# Patient Record
Sex: MALE | Race: WHITE | NOT HISPANIC OR LATINO | Employment: OTHER | URBAN - METROPOLITAN AREA
[De-identification: names, ages, dates, MRNs, and addresses within clinical notes are randomized per-mention and may not be internally consistent; named-entity substitution may affect disease eponyms.]

---

## 2020-06-10 ENCOUNTER — PATIENT OUTREACH (OUTPATIENT)
Dept: ADMINISTRATIVE | Facility: HOSPITAL | Age: 50
End: 2020-06-10

## 2020-06-10 NOTE — LETTER
Delicia 10, 2020    Dev Escalera  [id number]  [place] [id number]             [facility]  [address]  [facility] [address]  Phone: [phone] Dear [facility] is [facility].  To help you get the most out of each of your visits, we will review your information to make sure you are up to date on all of your recommended tests and/or procedures.      As a new patient to KYLE WEST NP , we may not have your complete medical records. She has found that your chart shows you may be due for:    Health Maintenance Due   Topic Date Due    Lipid Panel  1970    HIV Screening  02/24/1983    TETANUS VACCINE  02/24/1986    Shingles Vaccine (1 of 2) 02/24/2018    Colonoscopy  02/24/2018     If you have had any of the above done at another facility, please let us know so that we may obtain copies from that facility.  If you have a copy of these records, please provide a copy for us to scan into your chart.    If you are currently taking medication, please bring it with you to your appointment for review.    Also, if you have any type of Advanced Directives, please bring them with you to your office visit so we may scan them into your chart.      Thank You,  Your [facility]  Erica Sorenson L.P.N. Clinical Care Coordinator  [address] [place] [address], [place] [address] [phone]

## 2020-07-14 ENCOUNTER — OFFICE VISIT (OUTPATIENT)
Dept: FAMILY MEDICINE | Facility: CLINIC | Age: 50
End: 2020-07-14
Payer: MEDICAID

## 2020-07-14 ENCOUNTER — HOSPITAL ENCOUNTER (OUTPATIENT)
Dept: RADIOLOGY | Facility: HOSPITAL | Age: 50
Discharge: HOME | End: 2020-07-14
Attending: FAMILY MEDICINE
Payer: MEDICAID

## 2020-07-14 VITALS
HEART RATE: 73 BPM | HEIGHT: 72 IN | WEIGHT: 227 LBS | SYSTOLIC BLOOD PRESSURE: 125 MMHG | DIASTOLIC BLOOD PRESSURE: 78 MMHG | TEMPERATURE: 98 F | RESPIRATION RATE: 17 BRPM | OXYGEN SATURATION: 96 % | BODY MASS INDEX: 30.75 KG/M2

## 2020-07-14 DIAGNOSIS — Z91.89 PNEUMOCOCCAL VACCINATION INDICATED: ICD-10-CM

## 2020-07-14 DIAGNOSIS — M1A.0690 CHRONIC GOUT OF KNEE, UNSPECIFIED CAUSE, UNSPECIFIED LATERALITY: ICD-10-CM

## 2020-07-14 DIAGNOSIS — M25.441 SWELLING OF HAND JOINT, RIGHT: ICD-10-CM

## 2020-07-14 DIAGNOSIS — Z12.11 SCREENING FOR COLON CANCER: ICD-10-CM

## 2020-07-14 DIAGNOSIS — R06.09 DYSPNEA ON EXERTION: ICD-10-CM

## 2020-07-14 DIAGNOSIS — Z11.4 ENCOUNTER FOR SCREENING FOR HUMAN IMMUNODEFICIENCY VIRUS (HIV): ICD-10-CM

## 2020-07-14 DIAGNOSIS — R21 RASH: Primary | ICD-10-CM

## 2020-07-14 DIAGNOSIS — Z13.6 ENCOUNTER FOR LIPID SCREENING FOR CARDIOVASCULAR DISEASE: ICD-10-CM

## 2020-07-14 DIAGNOSIS — Z13.220 ENCOUNTER FOR LIPID SCREENING FOR CARDIOVASCULAR DISEASE: ICD-10-CM

## 2020-07-14 DIAGNOSIS — Z12.5 SCREENING FOR PROSTATE CANCER: ICD-10-CM

## 2020-07-14 PROCEDURE — 73130 XR HAND COMPLETE 3 VIEW RIGHT: ICD-10-PCS | Mod: 26,RT,, | Performed by: RADIOLOGY

## 2020-07-14 PROCEDURE — 90732 PNEUMOCOCCAL POLYSACCHARIDE VACCINE 23-VALENT =>2YO SQ IM: ICD-10-PCS | Mod: S$GLB,,, | Performed by: FAMILY MEDICINE

## 2020-07-14 PROCEDURE — 99204 PR OFFICE/OUTPT VISIT, NEW, LEVL IV, 45-59 MIN: ICD-10-PCS | Mod: S$GLB,25,, | Performed by: FAMILY MEDICINE

## 2020-07-14 PROCEDURE — 73130 X-RAY EXAM OF HAND: CPT | Mod: 26,RT,, | Performed by: RADIOLOGY

## 2020-07-14 PROCEDURE — 90471 IMMUNIZATION ADMIN: CPT | Mod: S$GLB,,, | Performed by: FAMILY MEDICINE

## 2020-07-14 PROCEDURE — 99204 OFFICE O/P NEW MOD 45 MIN: CPT | Mod: S$GLB,25,, | Performed by: FAMILY MEDICINE

## 2020-07-14 PROCEDURE — 90471 PNEUMOCOCCAL POLYSACCHARIDE VACCINE 23-VALENT =>2YO SQ IM: ICD-10-PCS | Mod: S$GLB,,, | Performed by: FAMILY MEDICINE

## 2020-07-14 PROCEDURE — 90732 PPSV23 VACC 2 YRS+ SUBQ/IM: CPT | Mod: S$GLB,,, | Performed by: FAMILY MEDICINE

## 2020-07-14 PROCEDURE — 73130 X-RAY EXAM OF HAND: CPT | Mod: TC,FY,RT

## 2020-07-14 RX ORDER — TRIAMCINOLONE ACETONIDE 1 MG/G
CREAM TOPICAL 2 TIMES DAILY
Qty: 15 G | Refills: 0 | Status: SHIPPED | OUTPATIENT
Start: 2020-07-14 | End: 2020-07-28

## 2020-07-14 NOTE — PROGRESS NOTES
[facility] - Clinic Note    Subjective      Mr. Escalera is a 50 y.o. male who presents to clinic with complaints of shortness of breath and a rash.     Rash: present for the last 6 months.   Locates the rash underneath the arms and on the face.   Admits to pruritis.   Denies any change in lotion/detergents/soaps/shampoos.   [facility] and was given a steroid cream and anti-fungal cream which did not help.     Shortness of breath: present for the past 4 months.   Present on exertion.   Alleviated with rest.   Has been smoking for >25 years. About 3 packs a day.   Denies chest pain.   Admits to wheezing.  Denies orthopnea or PND.    Patient is new to me.   Has a history of arthritis, GERD, and gout.   Has not had medical care for the past couple of years.     Complains of right hand pain.   Present intermittent for the past couple of years.  Aching pain throughout the hold hand that is associated with swelling.    [facility] Dev has a past medical history of Arthritis, GERD (gastroesophageal reflux disease), and Gout.   PSXH Dev has a past surgical history that includes Knee arthrodesis (Bilateral).    Dev's family history includes Cancer in his father, mother, and sister; Pancreatic cancer in his sister.    Dev reports that he has been smoking cigarettes. He has been smoking about 0.50 packs per day. He has never used smokeless tobacco. He reports previous alcohol use. He reports previous drug use.   ALG Dev is allergic to aspirin.   MED Dev has a current medication list which includes the following prescription(s): hydroxyzine pamoate, methylprednisolone, and suprep bowel prep kit.     Review of Systems   Constitutional: Negative for activity change, appetite change, chills, fatigue and fever.   Eyes: Negative for visual disturbance.   Respiratory: Positive for chest tightness, shortness of breath and wheezing. Negative for cough.    Cardiovascular:  Negative for chest pain, palpitations and leg swelling.   Gastrointestinal: Negative for abdominal pain, nausea and vomiting.   Musculoskeletal: Positive for arthralgias.        Right hand pain   Skin: Positive for rash. Negative for wound.   Neurological: Negative for dizziness, syncope and headaches.   Psychiatric/Behavioral: Negative for confusion.     Objective     /78   Pulse 73   Temp 97.5 °F (36.4 °C) (Temporal)   Resp 17   Ht 6' (1.829 m)   Wt 103 kg (227 lb)   SpO2 96%   BMI 30.79 kg/m²     Physical Exam   Constitutional:  Non-toxic appearance. He does not appear ill. No distress.   HENT:   Head: Normocephalic and atraumatic.   Eyes: Right eye exhibits no discharge. Left eye exhibits no discharge.   Cardiovascular: Normal rate, regular rhythm, normal heart sounds and normal pulses. Exam reveals no gallop and no friction rub.   No murmur heard.  Pulmonary/Chest: Effort normal and breath sounds normal. No respiratory distress. He has no wheezes. He has no rhonchi. He has no rales.   Abdominal: Normal appearance.   Musculoskeletal:      Right lower leg: No edema.      Left lower leg: No edema.      Comments: Right hand: No effusion, erythema, ecchymosis, warmth, or deformities noted. Non-tender to palpation. ROM intact. Strength 5/5   Lymphadenopathy:     He has no cervical adenopathy.   Neurological: He is alert.   Skin: Skin is warm and dry. Capillary refill takes less than 2 seconds. He is not diaphoretic.   Erythematous circular rash noted in the axillas bilaterally.   Dry scaly rash noted on the forehead   Psychiatric: His behavior is normal. Mood, judgment and thought content normal.   Vitals reviewed.     Assessment/Plan     Dev was seen today for shortness of breath and rash.    Diagnoses and all orders for this visit:  -New patient and new problem to me    Rash  -     TSH; Future  -     LISE; Future  -      triamcinolone acetonide 0.1% (KENALOG) 0.1 % cream; Apply topically 2 (two)  times daily.    Dyspnea on exertion  -     Comprehensive metabolic panel; Future  -     CBC auto differential; Future  -     Echo Color Flow Doppler? Yes; Future  -     Complete PFT w/ bronchodilator; Future    Swelling of hand joint, right  -     CBC auto differential; Future  -     LISE; Future  -     Sedimentation rate; Future  -     C-reactive protein; Future  -     Cancel: X-Ray Hand 2 View Right; Future    Chronic gout of knee, unspecified cause, unspecified laterality  -     Uric acid; Future    Screening for prostate cancer  -     PSA, Screening; Future    Screening for colon cancer  -     Ambulatory referral/consult to General Surgery; Future    Pneumococcal vaccination indicated  -     (In Office Administered) Pneumococcal Polysaccharide Vaccine (23 Valent) (SQ/IM)    Encounter for screening for human immunodeficiency virus (HIV)  -     HIV 1/2 Ag/Ab (4th Gen); Future    Encounter for lipid screening for cardiovascular disease  -     Lipid Panel; Future    Follow up in about 3 weeks (around 8/4/2020).      Zack Boggs MD  Family Medicine  [facility]

## 2020-07-16 DIAGNOSIS — Z12.11 COLON CANCER SCREENING: ICD-10-CM

## 2020-07-21 ENCOUNTER — TELEPHONE (OUTPATIENT)
Dept: FAMILY MEDICINE | Facility: CLINIC | Age: 50
End: 2020-07-21

## 2020-07-21 NOTE — TELEPHONE ENCOUNTER
Called pt to give xray results. Verbalize understanding of results but would like me to let Dr. Marie know that his rash has gotten significantly worse. Informed him I would send her a message and someone will call him back once she responds. Pt verbalized understanding.

## 2020-07-24 ENCOUNTER — TELEPHONE (OUTPATIENT)
Dept: FAMILY MEDICINE | Facility: CLINIC | Age: 50
End: 2020-07-24

## 2020-07-24 NOTE — TELEPHONE ENCOUNTER
Pt states he will send in a picture of his rash, informed him of dermatologist contact name and number. Pt verbalized an understanding.

## 2020-07-28 ENCOUNTER — OFFICE VISIT (OUTPATIENT)
Dept: FAMILY MEDICINE | Facility: CLINIC | Age: 50
End: 2020-07-28
Payer: MEDICAID

## 2020-07-28 VITALS
OXYGEN SATURATION: 97 % | DIASTOLIC BLOOD PRESSURE: 88 MMHG | HEIGHT: 72 IN | SYSTOLIC BLOOD PRESSURE: 138 MMHG | WEIGHT: 229.25 LBS | BODY MASS INDEX: 31.05 KG/M2 | HEART RATE: 79 BPM | TEMPERATURE: 98 F | RESPIRATION RATE: 15 BRPM

## 2020-07-28 DIAGNOSIS — Z11.59 NEED FOR HEPATITIS C SCREENING TEST: ICD-10-CM

## 2020-07-28 DIAGNOSIS — B35.4 TINEA CORPORIS: Primary | ICD-10-CM

## 2020-07-28 DIAGNOSIS — B35.6 TINEA CRURIS: ICD-10-CM

## 2020-07-28 PROCEDURE — 99214 PR OFFICE/OUTPT VISIT, EST, LEVL IV, 30-39 MIN: ICD-10-PCS | Mod: S$GLB,,, | Performed by: FAMILY MEDICINE

## 2020-07-28 PROCEDURE — 99214 OFFICE O/P EST MOD 30 MIN: CPT | Mod: S$GLB,,, | Performed by: FAMILY MEDICINE

## 2020-07-28 RX ORDER — TERBINAFINE HYDROCHLORIDE 250 MG/1
250 TABLET ORAL DAILY
Qty: 7 TABLET | Refills: 0 | Status: SHIPPED | OUTPATIENT
Start: 2020-07-28 | End: 2020-08-04

## 2020-07-28 RX ORDER — HYDROXYZINE PAMOATE 50 MG/1
50 CAPSULE ORAL EVERY 6 HOURS PRN
Qty: 30 CAPSULE | Refills: 1 | Status: SHIPPED | OUTPATIENT
Start: 2020-07-28

## 2020-07-28 NOTE — PROGRESS NOTES
Discussed overdue HM with patient.   Scheduled consult for Screening Colonoscopy with Dr. Ireland 8/12 at 1030A.   Scheduled labs 8/5 at 830A.   Advised office does not carry shingles vaccine, can obtain from local pharmacy.   All parties verbalized understanding with no further concerns voiced at this time.

## 2020-08-11 ENCOUNTER — TELEPHONE (OUTPATIENT)
Dept: SURGERY | Facility: CLINIC | Age: 50
End: 2020-08-11

## 2020-08-11 ENCOUNTER — PATIENT OUTREACH (OUTPATIENT)
Dept: ADMINISTRATIVE | Facility: OTHER | Age: 50
End: 2020-08-11

## 2020-08-11 NOTE — PROGRESS NOTES
[facility] - Clinic Note    Subjective      Mr. Escalera is a 50 y.o. male who presents to clinic with a follow up of a rash.     Patient was seen 2 weeks ago for a rash on under bilateral armpits.   Was prescribed kenalog cream BID at last visit.   States that the rash has not improved. Reports that he feels like it made it worse.   When he applied the cream he states that it made it burn.   Reports that the rash has also spread to his groin.   Admits to pruritis.  He has tried nystatin cream from urgent care prior to the kenalog cream.    PMH Dev has a past medical history of Arthritis, GERD (gastroesophageal reflux disease), and Gout.   PSXH Dev has a past surgical history that includes Knee arthrodesis (Bilateral).    Dev's family history includes Cancer in his father, mother, and sister; Pancreatic cancer in his sister.   SH Dev reports that he has been smoking cigarettes. He has been smoking about 0.50 packs per day. He has never used smokeless tobacco. He reports previous alcohol use. He reports previous drug use.   ALG Dev is allergic to aspirin.   MED Dev has a current medication list which includes the following prescription(s): hydroxyzine pamoate.     Review of Systems   Constitutional: Negative for activity change, appetite change, chills, fatigue and fever.   Eyes: Negative for visual disturbance.   Respiratory: Negative for cough and shortness of breath.    Cardiovascular: Negative for chest pain, palpitations and leg swelling.   Gastrointestinal: Negative for abdominal pain, nausea and vomiting.   Skin: Positive for rash. Negative for wound.   Neurological: Negative for dizziness, syncope and headaches.   Psychiatric/Behavioral: Negative for confusion.     Objective     /88   Pulse 79   Temp 97.9 °F (36.6 °C) (Temporal)   Resp 15   Ht 6' (1.829 m)   Wt 104 kg (229 lb 4 oz)   SpO2 97%   BMI 31.09 kg/m²     Physical Exam   Constitutional:  Non-toxic appearance. He does  not appear ill. No distress.   HENT:   Head: Normocephalic and atraumatic.   Eyes: Right eye exhibits no discharge. Left eye exhibits no discharge.   Cardiovascular: Normal rate, regular rhythm, normal heart sounds and normal pulses. Exam reveals no gallop and no friction rub.   No murmur heard.  Pulmonary/Chest: Effort normal and breath sounds normal. No respiratory distress. He has no wheezes. He has no rhonchi. He has no rales.   Abdominal: Normal appearance.   Musculoskeletal:      Right lower leg: No edema.      Left lower leg: No edema.   Lymphadenopathy:     He has no cervical adenopathy.   Neurological: He is alert.   Skin: Skin is warm and dry. Capillary refill takes less than 2 seconds. Rash noted. He is not diaphoretic.   Erythematous multiple circular patches coalesced that is scaly and slightly raised border. Noted in the axilla bilaterally and medial aspect of bilateral thighs   Psychiatric: His behavior is normal. Mood, judgment and thought content normal.   Vitals reviewed.     Assessment/Plan     Dev was seen today for follow-up of a rash    Diagnoses and all orders for this visit:    Tinea corporis  -     terbinafine HCL (LAMISIL) 250 mg tablet; Take 1 tablet (250 mg total) by mouth once daily. for 7 days  -     hydrOXYzine pamoate (VISTARIL) 50 MG Cap; Take 1 capsule (50 mg total) by mouth every 6 (six) hours as needed (itching).    Tinea cruris  -     terbinafine HCL (LAMISIL) 250 mg tablet; Take 1 tablet (250 mg total) by mouth once daily. for 7 days  -     hydrOXYzine pamoate (VISTARIL) 50 MG Cap; Take 1 capsule (50 mg total) by mouth every 6 (six) hours as needed (itching).    Need for hepatitis C screening test  -     Hepatitis C Antibody; Future    -will try oral therapy as he has failed treatment with nystatin. Adverse effects of med discussed with patient. He is to have labs drawn later this week.    Follow up in about 2 weeks (around 8/11/2020).    Future Appointments   Date Time  Provider Department [place]   8/12/2020 10:30 AM Chandana Ireland MD [facility]       Zack Boggs MD  Family Medicine  [facility]

## 2020-08-11 NOTE — TELEPHONE ENCOUNTER
Courtesy call list    ----- Message from Fran Jacobsen sent at 8/11/2020 12:33 PM CDT -----  Regarding: pt  Type:  Patient Returning Call    Who Called:  pt  Who Left Message for Patient:  office  Does the patient know what this is regarding?:  no  Best Call Back Number:  [phone]   Additional Information:

## 2020-08-11 NOTE — PROGRESS NOTES
Requested updates within Care Everywhere.  Patient's chart was reviewed for overdue MATTY topics.

## 2020-08-12 ENCOUNTER — OFFICE VISIT (OUTPATIENT)
Dept: SURGERY | Facility: CLINIC | Age: 50
End: 2020-08-12
Payer: MEDICAID

## 2020-08-12 VITALS
WEIGHT: 231 LBS | HEART RATE: 77 BPM | HEIGHT: 72 IN | BODY MASS INDEX: 31.29 KG/M2 | SYSTOLIC BLOOD PRESSURE: 133 MMHG | DIASTOLIC BLOOD PRESSURE: 82 MMHG | TEMPERATURE: 98 F | RESPIRATION RATE: 18 BRPM | OXYGEN SATURATION: 96 %

## 2020-08-12 DIAGNOSIS — Z12.11 SCREENING FOR COLON CANCER: Primary | ICD-10-CM

## 2020-08-12 DIAGNOSIS — R10.13 EPIGASTRIC PAIN: ICD-10-CM

## 2020-08-12 PROCEDURE — 99203 OFFICE O/P NEW LOW 30 MIN: CPT | Mod: S$GLB,,, | Performed by: SURGERY

## 2020-08-12 PROCEDURE — 99203 PR OFFICE/OUTPT VISIT, NEW, LEVL III, 30-44 MIN: ICD-10-PCS | Mod: S$GLB,,, | Performed by: SURGERY

## 2020-08-12 RX ORDER — LIDOCAINE HYDROCHLORIDE 10 MG/ML
1 INJECTION, SOLUTION EPIDURAL; INFILTRATION; INTRACAUDAL; PERINEURAL ONCE
Status: CANCELLED | OUTPATIENT
Start: 2020-08-12 | End: 2020-08-12

## 2020-08-12 RX ORDER — SODIUM, POTASSIUM,MAG SULFATES 17.5-3.13G
SOLUTION, RECONSTITUTED, ORAL ORAL
Qty: 2 BOTTLE | Refills: 0 | Status: SHIPPED | OUTPATIENT
Start: 2020-08-12

## 2020-08-12 RX ORDER — SODIUM CHLORIDE, SODIUM LACTATE, POTASSIUM CHLORIDE, CALCIUM CHLORIDE 600; 310; 30; 20 MG/100ML; MG/100ML; MG/100ML; MG/100ML
INJECTION, SOLUTION INTRAVENOUS CONTINUOUS
Status: CANCELLED | OUTPATIENT
Start: 2020-08-12

## 2020-08-12 NOTE — H&P
[facility] - General Surgery  General Surgery  H&P      Patient Name: Dev Escalera  MRN: [id number]     Chief Complaint:  I am here for a screening/wellness colonoscopy and upper scope.    HPI:  Mr. Escalera is seen today in consultation from Dr. Boggs for colon cancer screening. He has constant epigastric burning pain. No abdominal pain, nausea, vomiting, diarrhea, melena, hematochezia, change in bowel habits, change in stool characteristics, weight loss, decrease in caliber of stool, etc. He has noted a slight increased frequency of constipation.  No family history of colon cancer. No aggravating or alleviating factors. No other associated symptoms. No prior colonoscopy.      Allergies & Meds:     Allergen Reactions    Aspirin Swelling       Current Outpatient Medications   Medication Sig Dispense Refill    hydrOXYzine pamoate (VISTARIL) 50 MG Cap Take 1 capsule (50 mg total) by mouth every 6 (six) hours as needed (itching). 30 capsule 1    sodium,potassium,mag sulfates (SUPREP BOWEL PREP KIT) 17.5-3.13-1.6 gram SolR Follow written instructions provided by Clinic 2 Bottle 0         PMFSHx:  Past Medical History:   Diagnosis Date    Arthritis     GERD (gastroesophageal reflux disease)     Gout        Past Surgical History:   Procedure Laterality Date    KNEE ARTHRODESIS Bilateral        Family History   Problem Relation Age of Onset    Cancer Mother     Cancer Father     Cancer Sister     Pancreatic cancer Sister        Social History     Tobacco Use    Smoking status: Current Every Day Smoker     Packs/day: 0.50     Types: Cigarettes    Smokeless tobacco: Never Used   Substance Use Topics    Alcohol use: Not Currently    Drug use: Not Currently       Review of Systems   Constitutional: Negative for appetite change, chills, fatigue, fever and unexpected weight change.   HENT: Negative for congestion, dental problem, ear pain, mouth sores, postnasal drip, rhinorrhea, sore  throat, tinnitus, trouble swallowing and voice change.    Eyes: Negative for photophobia, pain, discharge and visual disturbance.   Respiratory: Negative for cough, chest tightness, shortness of breath and wheezing.    Cardiovascular: Negative for chest pain, palpitations and leg swelling.   Gastrointestinal: Negative for abdominal distention, anal bleeding, blood in stool, diarrhea, nausea, rectal pain and vomiting.   Endocrine: Negative for cold intolerance, heat intolerance, polydipsia, polyphagia and polyuria.   Genitourinary: Negative for difficulty urinating, dysuria, flank pain, frequency, hematuria and urgency.   Musculoskeletal: Negative for arthralgias, joint swelling and myalgias.   Skin: Negative for color change and rash.   Allergic/Immunologic: Negative for immunocompromised state.   Neurological: Negative for dizziness, tremors, seizures, syncope, speech difficulty, weakness, numbness and headaches.   Hematological: Negative for adenopathy. Does not bruise/bleed easily.   Psychiatric/Behavioral: Negative for agitation, confusion, hallucinations, self-injury and suicidal ideas. The patient is not nervous/anxious.             Physical Exam  Constitutional:       General: He is not in acute distress.     Appearance: Normal appearance. He is well-developed. He is not ill-appearing or toxic-appearing.      Comments: Body mass index is 31.33 kg/m².   HENT:      Head: Normocephalic and atraumatic. No contusion.      Right Ear: Hearing and external ear normal. No drainage or tenderness.      Left Ear: Hearing and external ear normal. No drainage or tenderness.      Nose: Nose normal. No rhinorrhea.   Eyes:      General: Lids are normal.         Right eye: No discharge.         Left eye: No discharge.      Conjunctiva/sclera: Conjunctivae normal.      Right eye: Right conjunctiva is not injected. No exudate.     Left eye: Left conjunctiva is not injected. No exudate.     Pupils: Pupils are equal, round, and  reactive to light.   Neck:      Musculoskeletal: Normal range of motion. No edema.      Thyroid: No thyroid mass or thyromegaly.      Vascular: No carotid bruit or JVD.      Trachea: Trachea and phonation normal. No tracheal deviation.   Cardiovascular:      Rate and Rhythm: Normal rate and regular rhythm.      Chest Wall: PMI is not displaced.      Heart sounds: Normal heart sounds, S1 normal and S2 normal. No murmur. No friction rub. No gallop.    Pulmonary:      Effort: Pulmonary effort is normal. No tachypnea, accessory muscle usage, respiratory distress or retractions.      Breath sounds: Normal breath sounds.   Chest:      Chest wall: No mass, tenderness or crepitus.      Breasts: Breasts are symmetrical.         Right: No inverted nipple, mass, nipple discharge or skin change.         Left: No inverted nipple, mass, nipple discharge or skin change.   Abdominal:      General: Bowel sounds are normal. There is no distension or abdominal bruit.      Palpations: Abdomen is soft. There is no fluid wave or mass.      Tenderness: There is no abdominal tenderness.      Hernia: No hernia is present. There is no hernia in the ventral area or left inguinal area.   Genitourinary:     Penis: Normal.       Scrotum/Testes: Normal.         Right: Mass or swelling not present.         Left: Mass or swelling not present.   Musculoskeletal:      Cervical back: Normal.      Thoracic back: Normal.      Lumbar back: Normal.      Right upper arm: Normal.      Left upper arm: Normal.      Right forearm: Normal.      Left forearm: Normal.      Right hand: Normal.      Left hand: Normal.      Right upper leg: Normal.      Left upper leg: Normal.      Right lower leg: Normal.      Left lower leg: Normal.      Right foot: Normal.      Left foot: Normal.   Lymphadenopathy:      Head:      Right side of head: No submental or submandibular adenopathy.      Left side of head: No submental or submandibular adenopathy.      Cervical: No  cervical adenopathy.      Upper Body:      Right upper body: No supraclavicular adenopathy.      Left upper body: No supraclavicular adenopathy.      Lower Body: No right inguinal adenopathy. No left inguinal adenopathy.   Skin:     General: Skin is warm and dry.      Findings: No lesion or rash.      Nails: There is no clubbing.   Neurological:      Mental Status: He is alert and oriented to person, place, and time. He is not disoriented.      GCS: GCS eye subscore is 4. GCS verbal subscore is 5. GCS motor subscore is 6.      Cranial Nerves: No cranial nerve deficit.      Sensory: No sensory deficit.      Motor: No tremor or atrophy.      Coordination: Coordination normal.      Gait: Gait normal.   Psychiatric:         Attention and Perception: He is attentive.         Speech: Speech normal.         Behavior: Behavior normal.         Thought Content: Thought content normal.         Medical Records Review:  Pending    Assessment:       Due for screening colonoscopy.  Differential diagnosis includes but not limited to colorectal cancer, diverticulosis, hemorrhoids, angiodysplasias, inflammatory bowel disease, etc.  Options include endoscopy, radiologic studies, second opinion, empiric management, observation, capsule endoscopy, etc.     Epigastric abdominal pain. Differential diagnosis includes is not limited to esophagitis, hiatal hernia, gastritis, gastric ulcer, duodenitis, biliary disease, esophageal neoplasm, gastric neoplasm, etc. Options include but are not limited to endoscopy, 2nd opinion, laparoscopy, radiologic studies, etc.      1. Screening for colon cancer    2. Epigastric pain          Plan:     Screening for colon cancer  -     Case Request Operating Room: COLONOSCOPY - screening, high risk screening, or diagnostic ( See H&P )  -     EKG 12-lead; Future; Expected date: 09/14/2020  -     COVID-19 Routine Screening; Future; Expected date: 09/14/2020    Epigastric pain    Other orders  -      sodium,potassium,mag sulfates (SUPREP BOWEL PREP KIT) 17.5-3.13-1.6 gram SolR; Follow written instructions provided by Clinic  Dispense: 2 Bottle; Refill: 0        Follow up around 9/23/2020 for routine post-endosocpy visit.    Counseling/Medical Decision Making:  Dev Escalera was counseled regarding the results of the evaluation thus far and the differential diagnosis.  Diagnostic and therapeutic options were discussed in detail along with the risks, benefits, possible complications, details of, and indications for each option.  Diagnoses discussed included but were not limited to: GB disease, GERD, hiatal hernia, PUD, gastritis, duodenitis, Sphincter of Oddi dysfunction, hemorrhoids, diverticulosis, cancer, IBD, IBS, benign tumors, angiodysplasias, ischemic disease.  Options discussed included but were not limited to: EGD, colonoscopy, proctoscopy, anoscopy, sigmoidoscopy, radiologic studies, PillCam, empiric therapy, second opinion, etc. Possible complications of endoscopy discussed included but were not limited to: bleeding, infections, endocarditis, injury to internal organs, incomplete exam, failure to diagnose cancer or other serious condition, need for emergency surgery, need for a temporary colostomy, need for additional operations or procedures, etc.  Entire conversation was held in layman's terms.  All unfamiliar terms were defined.  Questions solicited and answered.  I read the consent form to him verbatim.  Misa booklets were provided on EGD, GERD, GB disease / surgery, colonoscopy, polyps, diverticulosis, hemorrhoids, cancer, etc.  A copy of the consent form was provided for his review outside the office / hospital prior to the procedures.  At the conclusion of the conversation he voiced complete understanding of all we discussed and satisfaction that all of his questions had been answered to his full understanding.  He stated that he felt fully informed and completely capable of making an informed  decision.  He requests and desires to proceed with EGD and colonoscopy.

## 2020-08-12 NOTE — PROGRESS NOTES
Subjective:       Patient ID: Dev Escalera     Chief Complaint:  Consult      HPI:  Mr. Escalera is seen today in consultation from Dr. Boggs for colon cancer screening. He has constant epigastric burning pain. No abdominal pain, nausea, vomiting, diarrhea, melena, hematochezia, change in bowel habits, change in stool characteristics, weight loss, decrease in caliber of stool, etc. He has noted a slight increased frequency of constipation.  No family history of colon cancer. No aggravating or alleviating factors. No other associated symptoms. No prior colonoscopy.      Allergies & Meds:  Review of patient's allergies indicates:   Allergen Reactions    Aspirin Swelling       Current Outpatient Medications   Medication Sig Dispense Refill    hydrOXYzine pamoate (VISTARIL) 50 MG Cap Take 1 capsule (50 mg total) by mouth every 6 (six) hours as needed (itching). 30 capsule 1    sodium,potassium,mag sulfates (SUPREP BOWEL PREP KIT) 17.5-3.13-1.6 gram SolR Follow written instructions provided by Clinic 2 Bottle 0     No current facility-administered medications for this visit.        PMFSHx:  Past Medical History:   Diagnosis Date    Arthritis     GERD (gastroesophageal reflux disease)     Gout        Past Surgical History:   Procedure Laterality Date    KNEE ARTHRODESIS Bilateral        Family History   Problem Relation Age of Onset    Cancer Mother     Cancer Father     Cancer Sister     Pancreatic cancer Sister        Social History     Tobacco Use    Smoking status: Current Every Day Smoker     Packs/day: 0.50     Types: Cigarettes    Smokeless tobacco: Never Used   Substance Use Topics    Alcohol use: Not Currently    Drug use: Not Currently       Review of Systems   Constitutional: Negative for appetite change, chills, fatigue, fever and unexpected weight change.   HENT: Negative for congestion, dental problem, ear pain, mouth sores, postnasal drip, rhinorrhea, sore throat, tinnitus, trouble swallowing  and voice change.    Eyes: Negative for photophobia, pain, discharge and visual disturbance.   Respiratory: Negative for cough, chest tightness, shortness of breath and wheezing.    Cardiovascular: Negative for chest pain, palpitations and leg swelling.   Gastrointestinal: Negative for abdominal distention, anal bleeding, blood in stool, diarrhea, nausea, rectal pain and vomiting.   Endocrine: Negative for cold intolerance, heat intolerance, polydipsia, polyphagia and polyuria.   Genitourinary: Negative for difficulty urinating, dysuria, flank pain, frequency, hematuria and urgency.   Musculoskeletal: Negative for arthralgias, joint swelling and myalgias.   Skin: Negative for color change and rash.   Allergic/Immunologic: Negative for immunocompromised state.   Neurological: Negative for dizziness, tremors, seizures, syncope, speech difficulty, weakness, numbness and headaches.   Hematological: Negative for adenopathy. Does not bruise/bleed easily.   Psychiatric/Behavioral: Negative for agitation, confusion, hallucinations, self-injury and suicidal ideas. The patient is not nervous/anxious.             Physical Exam  Constitutional:       General: He is not in acute distress.     Appearance: Normal appearance. He is well-developed. He is not ill-appearing or toxic-appearing.      Comments: Body mass index is 31.33 kg/m².     HENT:      Head: Normocephalic and atraumatic. No contusion.      Right Ear: Hearing and external ear normal. No drainage or tenderness.      Left Ear: Hearing and external ear normal. No drainage or tenderness.      Nose: Nose normal. No rhinorrhea.   Eyes:      General: Lids are normal.         Right eye: No discharge.         Left eye: No discharge.      Conjunctiva/sclera: Conjunctivae normal.      Right eye: Right conjunctiva is not injected. No exudate.     Left eye: Left conjunctiva is not injected. No exudate.     Pupils: Pupils are equal, round, and reactive to light.   Neck:       Musculoskeletal: Normal range of motion. No edema.      Thyroid: No thyroid mass or thyromegaly.      Vascular: No carotid bruit or JVD.      Trachea: Trachea and phonation normal. No tracheal deviation.   Cardiovascular:      Rate and Rhythm: Normal rate and regular rhythm.      Chest Wall: PMI is not displaced.      Heart sounds: Normal heart sounds, S1 normal and S2 normal. No murmur. No friction rub. No gallop.    Pulmonary:      Effort: Pulmonary effort is normal. No tachypnea, accessory muscle usage, respiratory distress or retractions.      Breath sounds: Normal breath sounds.   Chest:      Chest wall: No mass, tenderness or crepitus.      Breasts: Breasts are symmetrical.         Right: No inverted nipple, mass, nipple discharge or skin change.         Left: No inverted nipple, mass, nipple discharge or skin change.   Abdominal:      General: Bowel sounds are normal. There is no distension or abdominal bruit.      Palpations: Abdomen is soft. There is no fluid wave or mass.      Tenderness: There is no abdominal tenderness.      Hernia: No hernia is present. There is no hernia in the ventral area or left inguinal area.   Genitourinary:     Penis: Normal.       Scrotum/Testes: Normal.         Right: Mass or swelling not present.         Left: Mass or swelling not present.   Musculoskeletal:      Cervical back: Normal.      Thoracic back: Normal.      Lumbar back: Normal.      Right upper arm: Normal.      Left upper arm: Normal.      Right forearm: Normal.      Left forearm: Normal.      Right hand: Normal.      Left hand: Normal.      Right upper leg: Normal.      Left upper leg: Normal.      Right lower leg: Normal.      Left lower leg: Normal.      Right foot: Normal.      Left foot: Normal.   Lymphadenopathy:      Head:      Right side of head: No submental or submandibular adenopathy.      Left side of head: No submental or submandibular adenopathy.      Cervical: No cervical adenopathy.      Upper Body:       Right upper body: No supraclavicular adenopathy.      Left upper body: No supraclavicular adenopathy.      Lower Body: No right inguinal adenopathy. No left inguinal adenopathy.   Skin:     General: Skin is warm and dry.      Findings: No lesion or rash.      Nails: There is no clubbing.     Neurological:      Mental Status: He is alert and oriented to person, place, and time. He is not disoriented.      GCS: GCS eye subscore is 4. GCS verbal subscore is 5. GCS motor subscore is 6.      Cranial Nerves: No cranial nerve deficit.      Sensory: No sensory deficit.      Motor: No tremor or atrophy.      Coordination: Coordination normal.      Gait: Gait normal.   Psychiatric:         Attention and Perception: He is attentive.         Speech: Speech normal.         Behavior: Behavior normal.         Thought Content: Thought content normal.             Medical Records Review:  Pending    Assessment:       Due for screening colonoscopy.  Differential diagnosis includes but not limited to colorectal cancer, diverticulosis, hemorrhoids, angiodysplasias, inflammatory bowel disease, etc.  Options include endoscopy, radiologic studies, second opinion, empiric management, observation, capsule endoscopy, etc.     Epigastric abdominal pain. Differential diagnosis includes is not limited to esophagitis, hiatal hernia, gastritis, gastric ulcer, duodenitis, biliary disease, esophageal neoplasm, gastric neoplasm, etc. Options include but are not limited to endoscopy, 2nd opinion, laparoscopy, radiologic studies, etc.      1. Screening for colon cancer    2. Epigastric pain          Plan:     Screening for colon cancer  -     Ambulatory referral/consult to General Surgery  -     Case Request Operating Room: COLONOSCOPY - screening, high risk screening, or diagnostic ( See H&P )  -     EKG 12-lead; Future; Expected date: 09/14/2020  -     COVID-19 Routine Screening; Future; Expected date: 09/14/2020    Epigastric pain    Other  orders  -     sodium,potassium,mag sulfates (SUPREP BOWEL PREP KIT) 17.5-3.13-1.6 gram SolR; Follow written instructions provided by Clinic  Dispense: 2 Bottle; Refill: 0        Follow up in about 6 weeks (around 9/23/2020) for routine post-endosocpy visit.    Counseling/Medical Decision Making:  Dev Escalera was counseled regarding the results of the evaluation thus far and the differential diagnosis.  Diagnostic and therapeutic options were discussed in detail along with the risks, benefits, possible complications, details of, and indications for each option.  Diagnoses discussed included but were not limited to: GB disease, GERD, hiatal hernia, PUD, gastritis, duodenitis, Sphincter of Oddi dysfunction, hemorrhoids, diverticulosis, cancer, IBD, IBS, benign tumors, angiodysplasias, ischemic disease.  Options discussed included but were not limited to: EGD, colonoscopy, proctoscopy, anoscopy, sigmoidoscopy, radiologic studies, PillCam, empiric therapy, second opinion, etc. Possible complications of endoscopy discussed included but were not limited to: bleeding, infections, endocarditis, injury to internal organs, incomplete exam, failure to diagnose cancer or other serious condition, need for emergency surgery, need for a temporary colostomy, need for additional operations or procedures, etc.  Entire conversation was held in layman's terms.  All unfamiliar terms were defined.  Questions solicited and answered.  I read the consent form to him verbatim.  Misa booklets were provided on EGD, GERD, GB disease / surgery, colonoscopy, polyps, diverticulosis, hemorrhoids, cancer, etc.  A copy of the consent form was provided for his review outside the office / hospital prior to the procedures.  At the conclusion of the conversation he voiced complete understanding of all we discussed and satisfaction that all of his questions had been answered to his full understanding.  He stated that he felt fully informed and completely  capable of making an informed decision.  He requests and desires to proceed with EGD and colonoscopy.    Total face-to-face encounter time was 45 minutes, 25 minutes spent counseling as outlined/summarized above    The above order for ambulatory referral/consult to General surgery is in the plan section of my note incorrectly.  This is the order from another provider that precipitated this office visit/encounter.  Currently there is an Epic software error that automatically pulls the referral diagnosis from the requesting provider into the plan section of this encounter note.  This order should be disregarded as part of this note/encounter.

## 2020-08-12 NOTE — LETTER
August 12, 2020      Zack Boggs MD  [address] [place] [address]           [facility] - General Surgery  [address] [place] [address]  Phone: [phone]  Fax: [phone]          Patient: Dev Escalera   MR Number: [id number]   YOB: 1970   Date of Visit: 8/12/2020       Dear Dr. Zack Boggs:    Thank you for referring Dev Escalera to me for evaluation. Attached you will find relevant portions of my assessment and plan of care.    If you have questions, please do not hesitate to call me. I look forward to following Dev Escalera along with you.    Sincerely,    Chandana Ireland MD    Enclosure  CC:  No Recipients    If you would like to receive this communication electronically, please contact [email or web address] or [phone] to request more information on [employer] Link access.    For providers and/or their staff who would like to refer a patient to [facility], please contact us through our one-stop-shop provider referral line, [facility], at [phone].    If you feel you have received this communication in error or would no longer like to receive these types of communications, please e-mail [email or web address]

## 2020-08-27 ENCOUNTER — OFFICE VISIT (OUTPATIENT)
Dept: FAMILY MEDICINE | Facility: CLINIC | Age: 50
End: 2020-08-27
Payer: MEDICAID

## 2020-08-27 VITALS
TEMPERATURE: 99 F | OXYGEN SATURATION: 97 % | DIASTOLIC BLOOD PRESSURE: 92 MMHG | SYSTOLIC BLOOD PRESSURE: 133 MMHG | BODY MASS INDEX: 30.54 KG/M2 | HEART RATE: 89 BPM | HEIGHT: 72 IN | WEIGHT: 225.5 LBS | RESPIRATION RATE: 15 BRPM

## 2020-08-27 DIAGNOSIS — L02.91 ABSCESS: Primary | ICD-10-CM

## 2020-08-27 DIAGNOSIS — R20.0 NUMBNESS AND TINGLING OF RIGHT LEG: ICD-10-CM

## 2020-08-27 DIAGNOSIS — R20.2 NUMBNESS AND TINGLING OF RIGHT LEG: ICD-10-CM

## 2020-08-27 PROCEDURE — 99214 PR OFFICE/OUTPT VISIT, EST, LEVL IV, 30-39 MIN: ICD-10-PCS | Mod: 25,S$GLB,, | Performed by: FAMILY MEDICINE

## 2020-08-27 PROCEDURE — 96372 PR INJECTION,THERAP/PROPH/DIAG2ST, IM OR SUBCUT: ICD-10-PCS | Mod: S$GLB,,, | Performed by: FAMILY MEDICINE

## 2020-08-27 PROCEDURE — 99214 OFFICE O/P EST MOD 30 MIN: CPT | Mod: 25,S$GLB,, | Performed by: FAMILY MEDICINE

## 2020-08-27 PROCEDURE — 96372 THER/PROPH/DIAG INJ SC/IM: CPT | Mod: S$GLB,,, | Performed by: FAMILY MEDICINE

## 2020-08-27 RX ORDER — DOXYCYCLINE HYCLATE 100 MG
100 TABLET ORAL EVERY 12 HOURS
Qty: 20 TABLET | Refills: 0 | Status: SHIPPED | OUTPATIENT
Start: 2020-08-27 | End: 2020-09-06

## 2020-08-27 RX ORDER — METHYLPREDNISOLONE 4 MG/1
TABLET ORAL
Qty: 1 PACKAGE | Refills: 0 | Status: SHIPPED | OUTPATIENT
Start: 2020-08-27 | End: 2020-09-17

## 2020-08-27 RX ORDER — KETOROLAC TROMETHAMINE 30 MG/ML
60 INJECTION, SOLUTION INTRAMUSCULAR; INTRAVENOUS
Status: COMPLETED | OUTPATIENT
Start: 2020-08-27 | End: 2020-08-27

## 2020-08-27 RX ADMIN — KETOROLAC TROMETHAMINE 60 MG: 30 INJECTION, SOLUTION INTRAMUSCULAR; INTRAVENOUS at 10:08

## 2020-08-27 NOTE — PROGRESS NOTES
[facility] - Clinic Note    Subjective      Mr. Escalera is a 50 y.o. male who presents to clinic with complaints of an abscess.     Patient was seen about 1 month ago for tinea cruris and corporis. He was treated with lamisil.   States that he took the full course and the rash has resolved.     States that he now has a boil on his left leg that his causing bad pain. No drainage or fevers.   Present for the past 3 days.   Unsure how he got it.     Also complaints of numbness and tingling down his right arm. Present for about 2 weeks.   Has been taking over the counter pain medication with no relief.   Denies any recent trauma or heavy lifting.   Started suddenly  Constant  Worse with movement.     PMH Dev has a past medical history of Arthritis, GERD (gastroesophageal reflux disease), and Gout.   PSXH Dev has a past surgical history that includes Knee arthrodesis (Bilateral).    Dev's family history includes Cancer in his father, mother, and sister; Pancreatic cancer in his sister.    Dev reports that he has been smoking cigarettes. He has been smoking about 0.50 packs per day. He has never used smokeless tobacco. He reports previous alcohol use. He reports previous drug use.   ALG Dev is allergic to aspirin.   MED Dev has a current medication list which includes the following prescription(s): hydroxyzine pamoate, suprep bowel prep kit, doxycycline, and methylprednisolone, and the following Facility-Administered Medications: ketorolac.     Review of Systems   Constitutional: Negative for activity change, appetite change, chills, fatigue and fever.   Eyes: Negative for visual disturbance.   Respiratory: Negative for cough and shortness of breath.    Cardiovascular: Negative for chest pain, palpitations and leg swelling.   Gastrointestinal: Negative for abdominal pain.   Skin: Positive for wound.   Neurological: Positive for weakness and numbness. Negative for dizziness and headaches.    Psychiatric/Behavioral: Negative for confusion.     Objective     BP (!) 133/92   Pulse 89   Temp 98.5 °F (36.9 °C) (Temporal)   Resp 15   Ht 6' (1.829 m)   Wt 102.3 kg (225 lb 8 oz)   SpO2 97%   BMI 30.58 kg/m²     Physical Exam   Constitutional:  Non-toxic appearance. He does not appear ill. No distress.   HENT:   Head: Normocephalic and atraumatic.   Eyes: Right eye exhibits no discharge. Left eye exhibits no discharge.   Cardiovascular: Normal rate, regular rhythm, normal heart sounds and normal pulses. Exam reveals no gallop and no friction rub.   No murmur heard.  Pulmonary/Chest: Effort normal and breath sounds normal. No respiratory distress. He has no wheezes. He has no rhonchi. He has no rales.   Abdominal: Normal appearance.   Musculoskeletal:      Right lower leg: No edema.      Left lower leg: No edema.      Comments: Right shoulder and arm: +TTP over the right upper trap. No effusion, erythema, ecchymosis, warmth, or deformities noted. ROM of the right arm intact but elicits pain.    Lymphadenopathy:     He has no cervical adenopathy.   Neurological: He is alert.   Sensation to soft touch intact on the right UE   Skin: Skin is warm and dry. Capillary refill takes less than 2 seconds. He is not diaphoretic.   About 3cm in diameter abscess noted in the left inner thigh.    Psychiatric: His behavior is normal. Mood, judgment and thought content normal.   Vitals reviewed.     Assessment/Plan     Dev was seen today for recurrent skin infections and numbness.    Diagnoses and all orders for this visit:    Abscess  -     doxycycline (VIBRA-TABS) 100 MG tablet; Take 1 tablet (100 mg total) by mouth every 12 (twelve) hours. for 10 days  - Warm compresses    Numbness and tingling of right leg  -     methylPREDNISolone (MEDROL DOSEPACK) 4 mg tablet; use as directed  -     ketorolac injection 60 mg  - Likely secondary to a pinched nerve. Will treat with steroids and monitor for improvement.    Follow  up in about 2 weeks (around 9/10/2020).    Future Appointments   Date Time Provider Department Center   9/14/2020  7:40 AM [facility], LABORATORY [facility] LAB [facility]   9/14/2020  7:50 AM [facility], LABORATORY [facility] LAB [facility]   9/14/2020  8:00 AM [facility] EKG [facility]   9/14/2020  8:20 AM COVID TESTING, [facility]   9/15/2020  3:00 PM [facility]   9/30/2020  9:45 AM Chandana Ireland MD [facility] G[facility]       Zack Boggs MD  Family Medicine  [facility]

## 2020-09-30 ENCOUNTER — PATIENT OUTREACH (OUTPATIENT)
Dept: ADMINISTRATIVE | Facility: OTHER | Age: 50
End: 2020-09-30

## 2020-09-30 NOTE — PROGRESS NOTES
Health Maintenance Due   Topic Date Due    Hepatitis C Screening  1970    HIV Screening  02/24/1983    Shingles Vaccine (1 of 2) 02/24/2018    Colorectal Cancer Screening  02/24/2018    Influenza Vaccine (1) 08/01/2020     Updates were requested from care everywhere.  Chart was reviewed for overdue Proactive [facility] Encounters (MATTY) topics (CRS, Breast Cancer Screening, Eye exam)  Health Maintenance has been updated.  LINKS immunization registry triggered.  Immunizations were not able to be reconciled. Patient not found in LINKS.

## 2020-10-05 ENCOUNTER — PATIENT MESSAGE (OUTPATIENT)
Dept: ADMINISTRATIVE | Facility: HOSPITAL | Age: 50
End: 2020-10-05

## 2021-01-04 ENCOUNTER — PATIENT MESSAGE (OUTPATIENT)
Dept: ADMINISTRATIVE | Facility: HOSPITAL | Age: 51
End: 2021-01-04

## 2021-01-29 ENCOUNTER — PATIENT MESSAGE (OUTPATIENT)
Dept: ADMINISTRATIVE | Facility: HOSPITAL | Age: 51
End: 2021-01-29

## 2021-02-03 ENCOUNTER — PATIENT MESSAGE (OUTPATIENT)
Dept: ADMINISTRATIVE | Facility: HOSPITAL | Age: 51
End: 2021-02-03

## 2021-04-07 ENCOUNTER — PATIENT MESSAGE (OUTPATIENT)
Dept: ADMINISTRATIVE | Facility: HOSPITAL | Age: 51
End: 2021-04-07

## 2021-07-22 DIAGNOSIS — Z12.11 COLON CANCER SCREENING: ICD-10-CM

## 2021-10-14 DIAGNOSIS — Z11.59 NEED FOR HEPATITIS C SCREENING TEST: ICD-10-CM

## 2022-01-11 ENCOUNTER — PATIENT MESSAGE (OUTPATIENT)
Dept: ADMINISTRATIVE | Facility: HOSPITAL | Age: 52
End: 2022-01-11
Payer: MEDICAID

## 2022-05-31 ENCOUNTER — PATIENT MESSAGE (OUTPATIENT)
Dept: ADMINISTRATIVE | Facility: HOSPITAL | Age: 52
End: 2022-05-31
Payer: MEDICAID

## 2022-07-27 DIAGNOSIS — Z12.11 COLON CANCER SCREENING: ICD-10-CM

## 2022-08-01 ENCOUNTER — PATIENT MESSAGE (OUTPATIENT)
Dept: ADMINISTRATIVE | Facility: HOSPITAL | Age: 52
End: 2022-08-01
Payer: MEDICAID

## 2022-11-28 ENCOUNTER — PATIENT MESSAGE (OUTPATIENT)
Dept: ADMINISTRATIVE | Facility: HOSPITAL | Age: 52
End: 2022-11-28
Payer: MEDICAID